# Patient Record
Sex: MALE | Race: WHITE | NOT HISPANIC OR LATINO | Employment: OTHER | URBAN - NONMETROPOLITAN AREA
[De-identification: names, ages, dates, MRNs, and addresses within clinical notes are randomized per-mention and may not be internally consistent; named-entity substitution may affect disease eponyms.]

---

## 2023-01-01 ENCOUNTER — OFFICE VISIT (OUTPATIENT)
Dept: PRIMARY CARE | Facility: CLINIC | Age: 78
End: 2023-01-01
Payer: MEDICARE

## 2023-01-01 ENCOUNTER — TELEPHONE (OUTPATIENT)
Dept: PRIMARY CARE | Facility: CLINIC | Age: 78
End: 2023-01-01
Payer: MEDICARE

## 2023-01-01 VITALS
HEIGHT: 67 IN | BODY MASS INDEX: 40.59 KG/M2 | WEIGHT: 258.6 LBS | SYSTOLIC BLOOD PRESSURE: 128 MMHG | OXYGEN SATURATION: 97 % | HEART RATE: 73 BPM | DIASTOLIC BLOOD PRESSURE: 80 MMHG

## 2023-01-01 VITALS
OXYGEN SATURATION: 100 % | SYSTOLIC BLOOD PRESSURE: 120 MMHG | WEIGHT: 262.35 LBS | HEART RATE: 117 BPM | HEIGHT: 67 IN | BODY MASS INDEX: 41.18 KG/M2 | DIASTOLIC BLOOD PRESSURE: 78 MMHG

## 2023-01-01 DIAGNOSIS — H61.23 BILATERAL IMPACTED CERUMEN: ICD-10-CM

## 2023-01-01 DIAGNOSIS — J40 BRONCHITIS: Primary | ICD-10-CM

## 2023-01-01 DIAGNOSIS — K64.9 HEMORRHOIDS, UNSPECIFIED HEMORRHOID TYPE: Primary | ICD-10-CM

## 2023-01-01 PROCEDURE — 1036F TOBACCO NON-USER: CPT | Performed by: FAMILY MEDICINE

## 2023-01-01 PROCEDURE — 3074F SYST BP LT 130 MM HG: CPT | Performed by: FAMILY MEDICINE

## 2023-01-01 PROCEDURE — 1160F RVW MEDS BY RX/DR IN RCRD: CPT | Performed by: FAMILY MEDICINE

## 2023-01-01 PROCEDURE — 69209 REMOVE IMPACTED EAR WAX UNI: CPT | Performed by: FAMILY MEDICINE

## 2023-01-01 PROCEDURE — 99213 OFFICE O/P EST LOW 20 MIN: CPT | Performed by: FAMILY MEDICINE

## 2023-01-01 PROCEDURE — 1159F MED LIST DOCD IN RCRD: CPT | Performed by: FAMILY MEDICINE

## 2023-01-01 PROCEDURE — 3079F DIAST BP 80-89 MM HG: CPT | Performed by: FAMILY MEDICINE

## 2023-01-01 PROCEDURE — 3078F DIAST BP <80 MM HG: CPT | Performed by: FAMILY MEDICINE

## 2023-01-01 RX ORDER — INSULIN GLARGINE 100 [IU]/ML
44 INJECTION, SOLUTION SUBCUTANEOUS NIGHTLY
COMMUNITY

## 2023-01-01 RX ORDER — METOPROLOL TARTRATE 50 MG/1
1.5 TABLET ORAL 2 TIMES DAILY
COMMUNITY

## 2023-01-01 RX ORDER — HYDRALAZINE HYDROCHLORIDE 25 MG/1
3 TABLET, FILM COATED ORAL DAILY
COMMUNITY

## 2023-01-01 RX ORDER — EZETIMIBE 10 MG/1
1 TABLET ORAL DAILY
COMMUNITY

## 2023-01-01 RX ORDER — AZITHROMYCIN 250 MG/1
TABLET, FILM COATED ORAL
Qty: 6 TABLET | Refills: 0 | Status: SHIPPED | OUTPATIENT
Start: 2023-01-01 | End: 2023-01-01

## 2023-01-01 RX ORDER — ASPIRIN 325 MG
TABLET, DELAYED RELEASE (ENTERIC COATED) ORAL
COMMUNITY

## 2023-01-01 RX ORDER — NYSTATIN 100000 U/G
1 CREAM TOPICAL 2 TIMES DAILY
COMMUNITY
Start: 2021-04-26

## 2023-01-01 RX ORDER — ASPIRIN 81 MG/1
TABLET ORAL
COMMUNITY

## 2023-01-01 RX ORDER — HYDROCORTISONE 25 MG/G
CREAM TOPICAL 2 TIMES DAILY
Qty: 30 G | Refills: 1 | Status: SHIPPED
Start: 2023-01-01 | End: 2023-06-30 | Stop reason: CLARIF

## 2023-01-01 RX ORDER — ALLOPURINOL 300 MG/1
1 TABLET ORAL 2 TIMES DAILY
COMMUNITY
Start: 2020-10-12

## 2023-01-01 RX ORDER — BUMETANIDE 2 MG/1
TABLET ORAL
COMMUNITY
Start: 2021-02-24

## 2023-01-01 RX ORDER — INSULIN LISPRO 100 [IU]/ML
INJECTION, SOLUTION INTRAVENOUS; SUBCUTANEOUS
COMMUNITY

## 2023-01-01 RX ORDER — WARFARIN 4 MG/1
TABLET ORAL
COMMUNITY

## 2023-01-01 ASSESSMENT — PATIENT HEALTH QUESTIONNAIRE - PHQ9
2. FEELING DOWN, DEPRESSED OR HOPELESS: NOT AT ALL
SUM OF ALL RESPONSES TO PHQ9 QUESTIONS 1 AND 2: 0
1. LITTLE INTEREST OR PLEASURE IN DOING THINGS: NOT AT ALL

## 2023-01-01 ASSESSMENT — ENCOUNTER SYMPTOMS
FEVER: 0
FEVER: 0
ABDOMINAL PAIN: 0
SHORTNESS OF BREATH: 1
COUGH: 1
CONSTIPATION: 0
CHILLS: 1
SINUS PRESSURE: 0
SINUS PAIN: 0
WHEEZING: 0
BLOOD IN STOOL: 1
SORE THROAT: 0
DIARRHEA: 0

## 2023-04-04 PROBLEM — E79.0 HYPERURICEMIA: Status: ACTIVE | Noted: 2023-01-01

## 2023-04-04 PROBLEM — I25.10 CAD (CORONARY ARTERY DISEASE), NATIVE CORONARY ARTERY: Status: ACTIVE | Noted: 2023-01-01

## 2023-04-04 PROBLEM — E11.65 TYPE 2 DIABETES MELLITUS WITH HYPERGLYCEMIA, WITH LONG-TERM CURRENT USE OF INSULIN (MULTI): Status: ACTIVE | Noted: 2023-01-01

## 2023-04-04 PROBLEM — N18.9: Status: ACTIVE | Noted: 2023-01-01

## 2023-04-04 PROBLEM — Z79.4 TYPE 2 DIABETES MELLITUS WITH HYPERGLYCEMIA, WITH LONG-TERM CURRENT USE OF INSULIN (MULTI): Status: ACTIVE | Noted: 2023-01-01

## 2023-04-04 PROBLEM — I10 HTN (HYPERTENSION): Status: ACTIVE | Noted: 2023-01-01

## 2023-04-04 PROBLEM — Z95.2 AORTIC VALVE REPLACED: Status: ACTIVE | Noted: 2023-01-01

## 2023-04-04 PROBLEM — E66.01 MORBID OBESITY WITH BMI OF 40.0-44.9, ADULT (MULTI): Status: ACTIVE | Noted: 2023-01-01

## 2023-04-04 PROBLEM — E78.00 HYPERCHOLESTEROLEMIA: Status: ACTIVE | Noted: 2023-01-01

## 2023-04-05 NOTE — PROGRESS NOTES
"Subjective   Patient ID: Jose Felipe is a 78 y.o. male who presents for bleeding hemmroids and Left ear (Can't hear).    HPI   Left ear feels plug and some pain     Hemorrhoid  rectal bleeding   3 days ago has bright red blood and painful   Started Sunday   Hemorrhoids last flared up 3 months ago     Colonoscopy 8/21/2020was normal DRT     Review of Systems   Constitutional:  Negative for fever.   Gastrointestinal:  Positive for blood in stool. Negative for abdominal pain, constipation and diarrhea.       Objective   /80 (BP Location: Right arm, Patient Position: Sitting)   Pulse 73   Ht 1.702 m (5' 7\")   Wt 117 kg (258 lb 9.6 oz)   SpO2 97%   BMI 40.50 kg/m²     Physical Exam  Constitutional:       Appearance: He is obese.   HENT:      Head: Normocephalic and atraumatic.      Right Ear: Tympanic membrane, ear canal and external ear normal. There is impacted cerumen.      Left Ear: Tympanic membrane, ear canal and external ear normal. There is impacted cerumen.   Abdominal:      General: There is distension.      Tenderness: There is no abdominal tenderness.      Comments: Has very poor rectal hygiene with loose stool in buttock crease has 2 large anal swellings at 3 and 9 o'clock no blood in stool    Neurological:      Mental Status: He is alert.         Assessment/Plan   Problem List Items Addressed This Visit    None  Visit Diagnoses       Hemorrhoids, unspecified hemorrhoid type    -  Primary    Relevant Medications    hydrocortisone (Anusol-HC) 2.5 % rectal cream    Bilateral impacted cerumen              Flushed both ears to clear without difficult  Recommend fiber, better rectal hygiene after each stool   If no improvement after 2 weeks see Gen surgery      "

## 2023-06-19 NOTE — TELEPHONE ENCOUNTER
----- Message from Hermes Ramirez MD sent at 6/19/2023  7:55 AM EDT -----  Let pt know his xray show fluid build up in his lungs He should double his lasix for the next 3 days and let dialysis know he has pulmonary venous congestion.  They may want to take a little more fluid off of  him.